# Patient Record
Sex: FEMALE | Race: WHITE | NOT HISPANIC OR LATINO | Employment: OTHER | URBAN - METROPOLITAN AREA
[De-identification: names, ages, dates, MRNs, and addresses within clinical notes are randomized per-mention and may not be internally consistent; named-entity substitution may affect disease eponyms.]

---

## 2023-06-21 ENCOUNTER — OFFICE VISIT (OUTPATIENT)
Dept: URGENT CARE | Facility: CLINIC | Age: 78
End: 2023-06-21
Payer: COMMERCIAL

## 2023-06-21 VITALS
DIASTOLIC BLOOD PRESSURE: 78 MMHG | RESPIRATION RATE: 18 BRPM | HEART RATE: 77 BPM | SYSTOLIC BLOOD PRESSURE: 140 MMHG | OXYGEN SATURATION: 97 % | TEMPERATURE: 98.7 F

## 2023-06-21 DIAGNOSIS — B02.9 HERPES ZOSTER WITHOUT COMPLICATION: ICD-10-CM

## 2023-06-21 DIAGNOSIS — L08.9 SKIN INFECTION: Primary | ICD-10-CM

## 2023-06-21 PROCEDURE — 99213 OFFICE O/P EST LOW 20 MIN: CPT

## 2023-06-21 PROCEDURE — S9088 SERVICES PROVIDED IN URGENT: HCPCS

## 2023-06-21 RX ORDER — VALACYCLOVIR HYDROCHLORIDE 1 G/1
1000 TABLET, FILM COATED ORAL 3 TIMES DAILY
Qty: 21 TABLET | Refills: 0 | Status: SHIPPED | OUTPATIENT
Start: 2023-06-21 | End: 2023-06-28

## 2023-06-21 RX ORDER — CEPHALEXIN 500 MG/1
500 CAPSULE ORAL EVERY 6 HOURS SCHEDULED
Qty: 28 CAPSULE | Refills: 0 | Status: SHIPPED | OUTPATIENT
Start: 2023-06-21 | End: 2023-06-28

## 2023-06-21 RX ORDER — ATORVASTATIN CALCIUM 10 MG/1
10 TABLET, FILM COATED ORAL DAILY
COMMUNITY

## 2023-06-21 RX ORDER — LEVOTHYROXINE SODIUM 0.1 MG/1
100 TABLET ORAL DAILY
COMMUNITY

## 2023-06-21 NOTE — PATIENT INSTRUCTIONS
Valtrex as directed for shingles  Start Keflex if redness, warmth, swelling, pain  Follow up with PCP in 3-5 days  Proceed to the ER with worsening symptoms  Shingles   AMBULATORY CARE:   Shingles  is a viral infection that causes a painful rash  Shingles is caused by the varicella-zoster virus  This is the same virus that causes chickenpox  The virus stays in your body after you have chickenpox, without causing any symptoms  Shingles occurs when the virus becomes active again  The active virus travels along a nerve to your skin and causes a rash  The rash usually lasts 2 to 3 weeks  Most people have shingles one time, but it is possible to develop it again  Common signs and symptoms:  Shingles can appear anywhere on your body, but it is most common on your torso  A line of painful blisters develops on the left or right side of your torso  The rash starts as red dots that become blisters filled with fluid  The blisters usually grow bigger, become filled with pus, and then crust over after a few days  You may also have any of the following:  Severe tiredness and muscle weakness    Pain when your skin is lightly touched    Headache    Fever    Eye pain when exposed to light       Call your local emergency number (911 in the [address] [facility]) if:   You have trouble moving your arms, legs, or face  You become confused, or have trouble speaking  You have a seizure  Seek care immediately if:   You have weakness in an arm or leg  You have dizziness, a severe headache, or hearing or vision loss  You have painful, red, warm skin around the blisters, or the blisters drain pus  Your neck is stiff or you have trouble moving it  Call your doctor if:   A painful rash appears near your eye  The rash spreads to more areas and your pain worsens  You feel weak or have a headache  You have a cough, chills, or a fever  You have abdominal pain or nausea, or you are vomiting      You have questions or concerns about your condition or care  Treatment:  Shingles cannot be cured  The following medicines can decrease your pain and help prevent complications:  Antiviral medicine  fights the virus causing your shingles  Start this medicine within 3 days after you notice the first symptoms  This may help prevent nerve pain  A shingles outbreak can cause nerve pain called post-herpetic neuralgia (PHN)  PHN can last a long time after you heal from shingles  Topical anesthetics  are used to numb the skin and decrease pain  They can be a cream, gel, spray, or patch  Anticonvulsants and antidepressants  decrease nerve pain and may help you sleep at night  Antihistamines  may help decrease itching  Acetaminophen  decreases pain and fever  It is available without a doctor's order  Ask how much to take and how often to take it  Follow directions  Read the labels of all other medicines you are using to see if they also contain acetaminophen, or ask your doctor or pharmacist  Acetaminophen can cause liver damage if not taken correctly  NSAIDs , such as ibuprofen, help decrease swelling, pain, and fever  This medicine is available with or without a doctor's order  NSAIDs can cause stomach bleeding or kidney problems in certain people  If you take blood thinner medicine, always ask your healthcare provider if NSAIDs are safe for you  Always read the medicine label and follow directions  A steroid and numbing medicine injection  may decrease severe pain that does not get better with other medicines  Self-care:   Apply a cool, wet compress  or take a cool bath  This may help decrease itching and pain  Keep your rash clean and dry  Cover your rash with a bandage  Do not use bandages with adhesive  Clothes may irritate your skin  Prevent the spread of the shingles virus:  The virus can be passed to a person who has never had chickenpox   This usually happens if the other person comes in contact with your open sores  This person may get chickenpox, but not shingles  You are contagious until your blisters scab over  Stay away from people who have not had chickenpox or the chickenpox vaccine  Avoid pregnant women, newborns, and people with weak immune systems  They have a higher risk of infection  Wash your hands often  Wash your hands several times each day  Wash after you use the bathroom, change a child's diaper, and before you prepare or eat food  Use soap and water every time  Rub your soapy hands together, lacing your fingers  Wash the front and back of your hands, and in between your fingers  Use the fingers of one hand to scrub under the fingernails of the other hand  Wash for at least 20 seconds  Rinse with warm, running water for several seconds  Then dry your hands with a clean towel or paper towel  Use hand  that contains alcohol if soap and water are not available  Do not touch your eyes, nose, or mouth without washing your hands first          Cover a sneeze or cough  Use a tissue that covers your mouth and nose  Throw the tissue away in a trash can right away  Use the bend of your arm if a tissue is not available  Wash your hands well with soap and water or use a hand   Prevent shingles or another shingles outbreak:   A vaccine may be given to help prevent shingles  You can get the vaccine even if you already had shingles  The vaccine comes in 2 forms  A 2-dose vaccine is usually given to adults 48 years or older  A 1-dose vaccine may be given to adults 61 years or older  The vaccine can help prevent a future outbreak  If you do get shingles again, the vaccine can keep it from becoming severe  Ask your healthcare provider about other vaccines you may need  Follow up with your doctor as directed:  Write down your questions so you remember to ask them during your visits    For more information:   [facility]  [address] [address]  Phone: 4- 839 - 0160695  Phone: 9- 842 - 5135144  Web Address: [employer] br    © Copyright [employer] 2022 Information is for End User's use only and may not be sold, redistributed or otherwise used for commercial purposes  The above information is an  only  It is not intended as medical advice for individual conditions or treatments  Talk to your doctor, nurse or pharmacist before following any medical regimen to see if it is safe and effective for you

## 2023-06-21 NOTE — PROGRESS NOTES
[facility]        NAME: Maxwell Guillen is a 77 y o  female  : 1945    MRN: [id number]  DATE: 2023  TIME: 4:24 PM    Assessment and Plan   Skin infection [L08 9]  1  Skin infection  cephalexin (KEFLEX) 500 mg capsule      2  Herpes zoster without complication  valACYclovir (VALTREX) 1,000 mg tablet            Patient Instructions     Valtrex as directed for shingles  Start Keflex if redness, warmth, swelling, pain  Follow up with PCP in 3-5 days  Proceed to the ER with worsening symptoms  Chief Complaint     Chief Complaint   Patient presents with   • Rash     Blister type rash to L fore arm started a week ago  Started abx ointment today states it remains the same however now appreas some what swollen  History of Present Illness       The patient presents today with complaints of a rash to her L forearm x 1 week  Over the past few days she has noticed increased redness  She denies a rash anywhere else on her body and feels well otherwise  She started using topical antibiotic today  Denies fever/chills  Review of Systems   Review of Systems   Constitutional: Negative for chills, fatigue and fever  HENT: Negative for congestion  Eyes: Negative  Respiratory: Negative for cough and shortness of breath  Cardiovascular: Negative for chest pain and palpitations  Gastrointestinal: Negative for abdominal pain, diarrhea, nausea and vomiting  Genitourinary: Negative for difficulty urinating  Musculoskeletal: Negative for myalgias  Skin: Positive for rash (L forearm)  Allergic/Immunologic: Negative for environmental allergies  Neurological: Negative for dizziness and headaches  Psychiatric/Behavioral: Negative            Current Medications       Current Outpatient Medications:   •  atorvastatin (LIPITOR) 10 mg tablet, Take 10 mg by mouth daily, Disp: , Rfl:   •  cephalexin (KEFLEX) 500 mg capsule, Take 1 capsule (500 mg total) by mouth every 6 (six) hours for 7 days, Disp: 28 capsule, Rfl: 0  •  levothyroxine 100 mcg tablet, Take 100 mcg by mouth daily, Disp: , Rfl:   •  valACYclovir (VALTREX) 1,000 mg tablet, Take 1 tablet (1,000 mg total) by mouth 3 (three) times a day for 7 days, Disp: 21 tablet, Rfl: 0    Current Allergies     Allergies as of 06/21/2023   • (No Known Allergies)            The following portions of the patient's history were reviewed and updated as appropriate: allergies, current medications, past family history, past medical history, past social history, past surgical history and problem list      Past Medical History:   Diagnosis Date   • Disease of thyroid gland        History reviewed  No pertinent surgical history  History reviewed  No pertinent family history  Medications have been verified  Objective   /78 (BP Location: Right arm, Patient Position: Sitting, Cuff Size: Standard) Comment: manual  Pulse 77   Temp 98 7 °F (37 1 °C) (Temporal)   Resp 18   SpO2 97%        Physical Exam     Physical Exam  Vitals and nursing note reviewed  Constitutional:       General: She is not in acute distress  Appearance: Normal appearance  She is not ill-appearing  HENT:      Head: Normocephalic and atraumatic  Right Ear: External ear normal       Left Ear: External ear normal       Nose: Nose normal       Mouth/Throat:      Lips: Pink  Mouth: Mucous membranes are moist    Eyes:      General: Vision grossly intact  Extraocular Movements: Extraocular movements intact  Pupils: Pupils are equal, round, and reactive to light  Cardiovascular:      Rate and Rhythm: Normal rate  Pulmonary:      Effort: Pulmonary effort is normal    Abdominal:      General: Abdomen is flat  Bowel sounds are normal       Palpations: Abdomen is soft  Musculoskeletal:         General: Normal range of motion  Cervical back: Normal range of motion  Skin:     General: Skin is warm  Findings: Rash present   Rash is vesicular  Comments: Vesicular rash and crusting noted to dorsal aspect of L forearm  No drainage or erythema noted  Neurological:      Mental Status: She is alert and oriented to person, place, and time  Motor: Motor function is intact     Psychiatric:         Attention and Perception: Attention normal          Mood and Affect: Mood normal